# Patient Record
Sex: MALE | Race: WHITE | NOT HISPANIC OR LATINO | Employment: FULL TIME | URBAN - NONMETROPOLITAN AREA
[De-identification: names, ages, dates, MRNs, and addresses within clinical notes are randomized per-mention and may not be internally consistent; named-entity substitution may affect disease eponyms.]

---

## 2017-10-27 ENCOUNTER — OFFICE VISIT (OUTPATIENT)
Dept: URGENT CARE | Facility: PHYSICIAN GROUP | Age: 52
End: 2017-10-27
Payer: COMMERCIAL

## 2017-10-27 VITALS
DIASTOLIC BLOOD PRESSURE: 70 MMHG | HEART RATE: 72 BPM | SYSTOLIC BLOOD PRESSURE: 126 MMHG | WEIGHT: 212 LBS | TEMPERATURE: 97.7 F | OXYGEN SATURATION: 96 % | RESPIRATION RATE: 16 BRPM

## 2017-10-27 DIAGNOSIS — R09.82 PND (POST-NASAL DRIP): ICD-10-CM

## 2017-10-27 DIAGNOSIS — Z88.9 H/O SEASONAL ALLERGIES: ICD-10-CM

## 2017-10-27 DIAGNOSIS — J02.8 SORE THROAT (VIRAL): ICD-10-CM

## 2017-10-27 DIAGNOSIS — B97.89 SORE THROAT (VIRAL): ICD-10-CM

## 2017-10-27 PROCEDURE — 99203 OFFICE O/P NEW LOW 30 MIN: CPT | Performed by: NURSE PRACTITIONER

## 2017-10-27 ASSESSMENT — ENCOUNTER SYMPTOMS
MYALGIAS: 0
HEADACHES: 0
FEVER: 0
ABDOMINAL PAIN: 0
EYE REDNESS: 0
NAUSEA: 0
CONSTIPATION: 0
VOMITING: 0
CHILLS: 0
NECK PAIN: 0
COUGH: 0
DIARRHEA: 0
SORE THROAT: 1
DIZZINESS: 0
EYE DISCHARGE: 0
WEAKNESS: 0

## 2017-10-27 NOTE — PROGRESS NOTES
"Subjective:      Minh Oliver is a 51 y.o. male who presents with Pharyngitis (x2wks on and off, Pt thinks it may be allergies)            HPI  Minh is a 51 year old male who is here for sore throat x 2 weeks. States will \"come and go\", \"worse in the morning\". Denies fever, nasal congestion, ear pressure. Dry scratchy throat with \"dry mouth\". Takes intermittent Zyrtec and Flonase.No salt water gargling. No fevers or exposure to strep.    PMH:  has no past medical history on file.  MEDS:   Current Outpatient Prescriptions:   •  esomeprazole (NEXIUM) 40 MG capsule, Take 40 mg by mouth every morning before breakfast., Disp: , Rfl:   •  EPINEPHrine 0.3 MG/0.3ML SOAJ, 0.3 mg by Injection route as needed (for bee sting (Autoinjector))., Disp: 2 Each, Rfl: 0  ALLERGIES:   Allergies   Allergen Reactions   • Bee      SURGHX: History reviewed. No pertinent surgical history.  SOCHX:  reports that he has never smoked. He has never used smokeless tobacco. He reports that he does not use drugs.  FH: Family history was reviewed, no pertinent findings to report    Review of Systems   Constitutional: Negative for chills, fever and malaise/fatigue.   HENT: Positive for sore throat. Negative for congestion and ear pain.    Eyes: Negative for discharge and redness.   Respiratory: Negative for cough.    Gastrointestinal: Negative for abdominal pain, constipation, diarrhea, nausea and vomiting.   Musculoskeletal: Negative for myalgias and neck pain.   Neurological: Negative for dizziness, weakness and headaches.   Endo/Heme/Allergies: Positive for environmental allergies.   All other systems reviewed and are negative.         Objective:     /70   Pulse 72   Temp 36.5 °C (97.7 °F)   Resp 16   Wt 96.2 kg (212 lb)   SpO2 96%      Physical Exam   Constitutional: He is oriented to person, place, and time. Vital signs are normal. He appears well-developed and well-nourished. He is active and cooperative.  Non-toxic " appearance. He does not have a sickly appearance. He does not appear ill. No distress.   HENT:   Head: Normocephalic.   Right Ear: Hearing, external ear and ear canal normal. Tympanic membrane is injected.   Left Ear: Hearing, external ear and ear canal normal. Tympanic membrane is injected.   Nose: Mucosal edema present. No rhinorrhea or sinus tenderness.   Mouth/Throat: Uvula is midline. Mucous membranes are dry. No uvula swelling. Posterior oropharyngeal erythema present. No oropharyngeal exudate or posterior oropharyngeal edema.   Eyes: Conjunctivae and EOM are normal. Pupils are equal, round, and reactive to light.   Neck: Normal range of motion. Neck supple.   Cardiovascular: Normal rate and regular rhythm.    Pulmonary/Chest: Effort normal and breath sounds normal. No accessory muscle usage. No respiratory distress.   Musculoskeletal: Normal range of motion.   Neurological: He is alert and oriented to person, place, and time.   Skin: Skin is warm and dry. He is not diaphoretic.   Vitals reviewed.              Assessment/Plan:     1. Sore throat (viral)    2. PND (post-nasal drip)    4. H/O seasonal allergies    Increase water intake  Get rest  May use Ibuprofen/Tylenol prn for any fever, body aches or throat pain  May take longer acting antihistamine for seasonal allergy symptoms prn  May use saline nasal spray for nasal congestion  May use Flonase or Nasocort for allergen nasal congestion  May gargle with salt water prn for throat discomfort  May drink smoothies for nutrition if too painful to swallow solid foods  Monitor for productive cough, SOB and chest pain/tightness- need re-evaluation

## 2021-07-27 ENCOUNTER — PRE-ADMISSION TESTING (OUTPATIENT)
Dept: ADMISSIONS | Facility: MEDICAL CENTER | Age: 56
End: 2021-07-27
Attending: UROLOGY
Payer: COMMERCIAL

## 2021-07-27 ENCOUNTER — APPOINTMENT (OUTPATIENT)
Dept: ADMISSIONS | Facility: MEDICAL CENTER | Age: 56
End: 2021-07-27
Payer: COMMERCIAL

## 2021-07-27 VITALS — BODY MASS INDEX: 24.87 KG/M2 | HEIGHT: 75 IN | WEIGHT: 200 LBS

## 2021-07-27 RX ORDER — TAMSULOSIN HYDROCHLORIDE 0.4 MG/1
CAPSULE ORAL
COMMUNITY
Start: 2021-07-19 | End: 2021-07-27

## 2021-07-27 RX ORDER — SULFAMETHOXAZOLE AND TRIMETHOPRIM 800; 160 MG/1; MG/1
TABLET ORAL
COMMUNITY
Start: 2021-07-19 | End: 2021-07-27

## 2021-07-27 RX ORDER — ONDANSETRON 4 MG/1
TABLET, ORALLY DISINTEGRATING ORAL
COMMUNITY
Start: 2021-07-19

## 2021-07-27 RX ORDER — HYDROCODONE BITARTRATE AND ACETAMINOPHEN 10; 300 MG/1; MG/1
TABLET ORAL
COMMUNITY
Start: 2021-07-19 | End: 2021-07-27

## 2021-07-27 RX ORDER — HYDROCODONE BITARTRATE AND ACETAMINOPHEN 10; 325 MG/1; MG/1
TABLET ORAL
COMMUNITY
Start: 2021-07-19 | End: 2022-09-27

## 2021-07-27 RX ORDER — PROMETHAZINE HYDROCHLORIDE 12.5 MG/1
SUPPOSITORY RECTAL
COMMUNITY
Start: 2021-07-17 | End: 2022-09-27

## 2021-07-27 RX ORDER — TAMSULOSIN HYDROCHLORIDE 0.4 MG/1
CAPSULE ORAL
COMMUNITY
Start: 2021-07-19 | End: 2022-09-27

## 2021-07-27 RX ORDER — SULFAMETHOXAZOLE AND TRIMETHOPRIM 800; 160 MG/1; MG/1
1 TABLET ORAL DAILY
COMMUNITY
Start: 2021-07-19 | End: 2022-09-27

## 2021-07-27 NOTE — PREPROCEDURE INSTRUCTIONS
"Preadmit Phone appointment: \" Preparing for your Procedure information\" Instructions discussed with Patient.   Patient instructed to continue prescribed medications through the day before surgery, instructed to take the following medications the day of surgery per anesthesia protocol: norco, omeprazole, zofran, flomax.   Pt states, \"no issues with anesthesia\".  Fasting guidelines discussed with Patient,  NPO from solid food at midnight prior to surgery.  Pt encouraged to hydrate the day before surgery.with NPO from solid food at midnight prior to surgery and 3 hour cutoff for clear liquids.   Clear liquids defined.  All Pt's questions and concerns answered or addressed.  Emailed all instructions.  COVID Vaccinated.  "

## 2021-07-28 ENCOUNTER — ANESTHESIA EVENT (OUTPATIENT)
Dept: SURGERY | Facility: MEDICAL CENTER | Age: 56
End: 2021-07-28
Payer: COMMERCIAL

## 2021-07-28 ENCOUNTER — ANESTHESIA (OUTPATIENT)
Dept: SURGERY | Facility: MEDICAL CENTER | Age: 56
End: 2021-07-28
Payer: COMMERCIAL

## 2021-07-28 ENCOUNTER — APPOINTMENT (OUTPATIENT)
Dept: RADIOLOGY | Facility: MEDICAL CENTER | Age: 56
End: 2021-07-28
Attending: UROLOGY
Payer: COMMERCIAL

## 2021-07-28 ENCOUNTER — HOSPITAL ENCOUNTER (OUTPATIENT)
Facility: MEDICAL CENTER | Age: 56
End: 2021-07-28
Attending: UROLOGY | Admitting: UROLOGY
Payer: COMMERCIAL

## 2021-07-28 VITALS
WEIGHT: 202.6 LBS | TEMPERATURE: 96.8 F | RESPIRATION RATE: 16 BRPM | BODY MASS INDEX: 25.32 KG/M2 | HEART RATE: 52 BPM | SYSTOLIC BLOOD PRESSURE: 146 MMHG | DIASTOLIC BLOOD PRESSURE: 81 MMHG | OXYGEN SATURATION: 98 %

## 2021-07-28 PROBLEM — N28.9 RENAL DISORDER: Status: ACTIVE | Noted: 2021-01-01

## 2021-07-28 LAB — PATHOLOGY CONSULT NOTE: NORMAL

## 2021-07-28 PROCEDURE — 160028 HCHG SURGERY MINUTES - 1ST 30 MINS LEVEL 3: Performed by: UROLOGY

## 2021-07-28 PROCEDURE — 700102 HCHG RX REV CODE 250 W/ 637 OVERRIDE(OP): Performed by: UROLOGY

## 2021-07-28 PROCEDURE — 500879 HCHG PACK, CYSTO: Performed by: UROLOGY

## 2021-07-28 PROCEDURE — C1758 CATHETER, URETERAL: HCPCS | Performed by: UROLOGY

## 2021-07-28 PROCEDURE — 160035 HCHG PACU - 1ST 60 MINS PHASE I: Performed by: UROLOGY

## 2021-07-28 PROCEDURE — C1894 INTRO/SHEATH, NON-LASER: HCPCS | Performed by: UROLOGY

## 2021-07-28 PROCEDURE — A9270 NON-COVERED ITEM OR SERVICE: HCPCS | Performed by: UROLOGY

## 2021-07-28 PROCEDURE — 160039 HCHG SURGERY MINUTES - EA ADDL 1 MIN LEVEL 3: Performed by: UROLOGY

## 2021-07-28 PROCEDURE — 700105 HCHG RX REV CODE 258: Performed by: UROLOGY

## 2021-07-28 PROCEDURE — C2617 STENT, NON-COR, TEM W/O DEL: HCPCS | Performed by: UROLOGY

## 2021-07-28 PROCEDURE — 160025 RECOVERY II MINUTES (STATS): Performed by: UROLOGY

## 2021-07-28 PROCEDURE — 160002 HCHG RECOVERY MINUTES (STAT): Performed by: UROLOGY

## 2021-07-28 PROCEDURE — 160048 HCHG OR STATISTICAL LEVEL 1-5: Performed by: UROLOGY

## 2021-07-28 PROCEDURE — 700101 HCHG RX REV CODE 250: Performed by: UROLOGY

## 2021-07-28 PROCEDURE — 500062 HCHG BASKET: Performed by: UROLOGY

## 2021-07-28 PROCEDURE — C1769 GUIDE WIRE: HCPCS | Performed by: UROLOGY

## 2021-07-28 PROCEDURE — 88300 SURGICAL PATH GROSS: CPT

## 2021-07-28 PROCEDURE — 700111 HCHG RX REV CODE 636 W/ 250 OVERRIDE (IP): Performed by: ANESTHESIOLOGY

## 2021-07-28 PROCEDURE — 501329 HCHG SET, CYSTO IRRIG Y TUR: Performed by: UROLOGY

## 2021-07-28 PROCEDURE — 160009 HCHG ANES TIME/MIN: Performed by: UROLOGY

## 2021-07-28 PROCEDURE — 82365 CALCULUS SPECTROSCOPY: CPT

## 2021-07-28 PROCEDURE — 700101 HCHG RX REV CODE 250: Performed by: ANESTHESIOLOGY

## 2021-07-28 PROCEDURE — 160046 HCHG PACU - 1ST 60 MINS PHASE II: Performed by: UROLOGY

## 2021-07-28 DEVICE — STENT UROLOGICAL POLARIS 6X26  ULTRA: Type: IMPLANTABLE DEVICE | Site: URETER | Status: FUNCTIONAL

## 2021-07-28 RX ORDER — HYDRALAZINE HYDROCHLORIDE 20 MG/ML
5 INJECTION INTRAMUSCULAR; INTRAVENOUS
Status: DISCONTINUED | OUTPATIENT
Start: 2021-07-28 | End: 2021-07-28 | Stop reason: HOSPADM

## 2021-07-28 RX ORDER — SODIUM CHLORIDE, SODIUM LACTATE, POTASSIUM CHLORIDE, CALCIUM CHLORIDE 600; 310; 30; 20 MG/100ML; MG/100ML; MG/100ML; MG/100ML
INJECTION, SOLUTION INTRAVENOUS CONTINUOUS
Status: DISCONTINUED | OUTPATIENT
Start: 2021-07-28 | End: 2021-07-28 | Stop reason: HOSPADM

## 2021-07-28 RX ORDER — DIPHENHYDRAMINE HYDROCHLORIDE 50 MG/ML
12.5 INJECTION INTRAMUSCULAR; INTRAVENOUS
Status: DISCONTINUED | OUTPATIENT
Start: 2021-07-28 | End: 2021-07-28 | Stop reason: HOSPADM

## 2021-07-28 RX ORDER — HYDROMORPHONE HYDROCHLORIDE 1 MG/ML
0.4 INJECTION, SOLUTION INTRAMUSCULAR; INTRAVENOUS; SUBCUTANEOUS
Status: DISCONTINUED | OUTPATIENT
Start: 2021-07-28 | End: 2021-07-28 | Stop reason: HOSPADM

## 2021-07-28 RX ORDER — HALOPERIDOL 5 MG/ML
1 INJECTION INTRAMUSCULAR
Status: DISCONTINUED | OUTPATIENT
Start: 2021-07-28 | End: 2021-07-28 | Stop reason: HOSPADM

## 2021-07-28 RX ORDER — OXYCODONE HCL 5 MG/5 ML
5 SOLUTION, ORAL ORAL
Status: DISCONTINUED | OUTPATIENT
Start: 2021-07-28 | End: 2021-07-28 | Stop reason: HOSPADM

## 2021-07-28 RX ORDER — ONDANSETRON 2 MG/ML
INJECTION INTRAMUSCULAR; INTRAVENOUS PRN
Status: DISCONTINUED | OUTPATIENT
Start: 2021-07-28 | End: 2021-07-28 | Stop reason: SURG

## 2021-07-28 RX ORDER — LABETALOL HYDROCHLORIDE 5 MG/ML
5 INJECTION, SOLUTION INTRAVENOUS
Status: DISCONTINUED | OUTPATIENT
Start: 2021-07-28 | End: 2021-07-28 | Stop reason: HOSPADM

## 2021-07-28 RX ORDER — DEXAMETHASONE SODIUM PHOSPHATE 4 MG/ML
INJECTION, SOLUTION INTRA-ARTICULAR; INTRALESIONAL; INTRAMUSCULAR; INTRAVENOUS; SOFT TISSUE PRN
Status: DISCONTINUED | OUTPATIENT
Start: 2021-07-28 | End: 2021-07-28 | Stop reason: SURG

## 2021-07-28 RX ORDER — MEPERIDINE HYDROCHLORIDE 25 MG/ML
6.25 INJECTION INTRAMUSCULAR; INTRAVENOUS; SUBCUTANEOUS
Status: DISCONTINUED | OUTPATIENT
Start: 2021-07-28 | End: 2021-07-28 | Stop reason: HOSPADM

## 2021-07-28 RX ORDER — ONDANSETRON 2 MG/ML
4 INJECTION INTRAMUSCULAR; INTRAVENOUS
Status: DISCONTINUED | OUTPATIENT
Start: 2021-07-28 | End: 2021-07-28 | Stop reason: HOSPADM

## 2021-07-28 RX ORDER — HYDROMORPHONE HYDROCHLORIDE 1 MG/ML
0.2 INJECTION, SOLUTION INTRAMUSCULAR; INTRAVENOUS; SUBCUTANEOUS
Status: DISCONTINUED | OUTPATIENT
Start: 2021-07-28 | End: 2021-07-28 | Stop reason: HOSPADM

## 2021-07-28 RX ORDER — HYDROMORPHONE HYDROCHLORIDE 1 MG/ML
0.1 INJECTION, SOLUTION INTRAMUSCULAR; INTRAVENOUS; SUBCUTANEOUS
Status: DISCONTINUED | OUTPATIENT
Start: 2021-07-28 | End: 2021-07-28 | Stop reason: HOSPADM

## 2021-07-28 RX ORDER — OXYCODONE HCL 5 MG/5 ML
10 SOLUTION, ORAL ORAL
Status: DISCONTINUED | OUTPATIENT
Start: 2021-07-28 | End: 2021-07-28 | Stop reason: HOSPADM

## 2021-07-28 RX ORDER — CEFAZOLIN SODIUM 1 G/3ML
INJECTION, POWDER, FOR SOLUTION INTRAMUSCULAR; INTRAVENOUS PRN
Status: DISCONTINUED | OUTPATIENT
Start: 2021-07-28 | End: 2021-07-28 | Stop reason: SURG

## 2021-07-28 RX ORDER — SODIUM CHLORIDE, SODIUM LACTATE, POTASSIUM CHLORIDE, CALCIUM CHLORIDE 600; 310; 30; 20 MG/100ML; MG/100ML; MG/100ML; MG/100ML
INJECTION, SOLUTION INTRAVENOUS CONTINUOUS
Status: ACTIVE | OUTPATIENT
Start: 2021-07-28 | End: 2021-07-28

## 2021-07-28 RX ORDER — LIDOCAINE HYDROCHLORIDE 20 MG/ML
INJECTION, SOLUTION EPIDURAL; INFILTRATION; INTRACAUDAL; PERINEURAL PRN
Status: DISCONTINUED | OUTPATIENT
Start: 2021-07-28 | End: 2021-07-28 | Stop reason: SURG

## 2021-07-28 RX ADMIN — DEXAMETHASONE SODIUM PHOSPHATE 8 MG: 4 INJECTION, SOLUTION INTRAMUSCULAR; INTRAVENOUS at 13:46

## 2021-07-28 RX ADMIN — FENTANYL CITRATE 50 MCG: 50 INJECTION, SOLUTION INTRAMUSCULAR; INTRAVENOUS at 13:55

## 2021-07-28 RX ADMIN — FENTANYL CITRATE 50 MCG: 50 INJECTION, SOLUTION INTRAMUSCULAR; INTRAVENOUS at 13:46

## 2021-07-28 RX ADMIN — FENTANYL CITRATE 50 MCG: 50 INJECTION, SOLUTION INTRAMUSCULAR; INTRAVENOUS at 14:27

## 2021-07-28 RX ADMIN — LIDOCAINE HYDROCHLORIDE 50 MG: 20 INJECTION, SOLUTION EPIDURAL; INFILTRATION; INTRACAUDAL; PERINEURAL at 13:45

## 2021-07-28 RX ADMIN — CEFAZOLIN 2 G: 330 INJECTION, POWDER, FOR SOLUTION INTRAMUSCULAR; INTRAVENOUS at 13:46

## 2021-07-28 RX ADMIN — SODIUM CHLORIDE, POTASSIUM CHLORIDE, SODIUM LACTATE AND CALCIUM CHLORIDE: 600; 310; 30; 20 INJECTION, SOLUTION INTRAVENOUS at 11:44

## 2021-07-28 RX ADMIN — LIDOCAINE HYDROCHLORIDE 0.5 ML: 10 INJECTION, SOLUTION INFILTRATION; PERINEURAL at 11:44

## 2021-07-28 RX ADMIN — PROPOFOL 200 MG: 10 INJECTION, EMULSION INTRAVENOUS at 13:45

## 2021-07-28 RX ADMIN — ONDANSETRON 4 MG: 2 INJECTION INTRAMUSCULAR; INTRAVENOUS at 13:46

## 2021-07-28 RX ADMIN — POVIDONE IODINE 15 ML: 100 SOLUTION TOPICAL at 11:44

## 2021-07-28 NOTE — OR NURSING
1546: Pt describes void (not witnessed by RN) as bloody but no clots.  1555: D/Jim to care of family post uneventful stay in PACU 2.

## 2021-07-28 NOTE — DISCHARGE INSTRUCTIONS
ACTIVITY: Rest and take it easy for the first 24 hours.  A responsible adult is recommended to remain with you during that time.  It is normal to feel sleepy.  We encourage you to not do anything that requires balance, judgment or coordination.    MILD FLU-LIKE SYMPTOMS ARE NORMAL. YOU MAY EXPERIENCE GENERALIZED MUSCLE ACHES, THROAT IRRITATION, HEADACHE AND/OR SOME NAUSEA.    FOR 24 HOURS DO NOT:  Drive, operate machinery or run household appliances.  Drink beer or alcoholic beverages.   Make important decisions or sign legal documents.    SPECIAL INSTRUCTIONS: Drink a lot of fluids to keep urine dilute. Follow-up in office on Monday 8/2 for stent on a string removal. Follow-up with Dr. Fountain in 6 weeks with ultrasound of the kidney and bladder day of visit.    DIET: To avoid nausea, slowly advance diet as tolerated, avoiding spicy or greasy foods for the first day.  Add more substantial food to your diet according to your physician's instructions.  INCREASE FLUIDS AND FIBER TO AVOID CONSTIPATION.    SURGICAL DRESSING/BATHING: may shower. No soaking in tub baths or hot tubs until follow up with MD    FOLLOW-UP APPOINTMENT:  A follow-up appointment should be arranged with your doctor; call to schedule.    You should CALL YOUR PHYSICIAN if you develop:  Fever greater than 101 degrees F.  Pain not relieved by medication, or persistent nausea or vomiting.  Excessive bleeding (blood soaking through dressing) or unexpected drainage from the wound.  Extreme redness or swelling around the incision site, drainage of pus or foul smelling drainage.  Inability to urinate or empty your bladder within 8 hours.  Problems with breathing or chest pain.    You should call 911 if you develop problems with breathing or chest pain.  If you are unable to contact your doctor or surgical center, you should go to the nearest emergency room or urgent care center.  Physician's telephone #: [phone]    If any questions arise, call your  doctor.  If your doctor is not available, please feel free to call the Surgical Center at [phone]. The Contact Center is open Monday through Friday 7AM to 5PM and may speak to a nurse at [phone], or toll free at [phone].     A registered nurse may call you a few days after your surgery to see how you are doing after your procedure.    MEDICATIONS: Resume taking daily medication.  Take prescribed pain medication with food.  If no medication is prescribed, you may take non-aspirin pain medication if needed.  PAIN MEDICATION CAN BE VERY CONSTIPATING.  Take a stool softener or laxative such as senokot, pericolace, or milk of magnesia if needed.    Prescription given for n/a.  Last pain medication given at n/a.    If your physician has prescribed pain medication that includes Acetaminophen (Tylenol), do not take additional Acetaminophen (Tylenol) while taking the prescribed medication.    Depression / Suicide Risk    As you are discharged from this [facility] facility, it is important to learn how to keep safe from harming yourself.    Recognize the warning signs:  · Abrupt changes in personality, positive or negative- including increase in energy   · Giving away possessions  · Change in eating patterns- significant weight changes-  positive or negative  · Change in sleeping patterns- unable to sleep or sleeping all the time   · Unwillingness or inability to communicate  · Depression  · Unusual sadness, discouragement and loneliness  · Talk of wanting to die  · Neglect of personal appearance   · Rebelliousness- reckless behavior  · Withdrawal from people/activities they love  · Confusion- inability to concentrate     If you or a loved one observes any of these behaviors or has concerns about self-harm, here's what you can do:  · Talk about it- your feelings and reasons for harming yourself  · Remove any means that you might use to hurt yourself (examples: pills, rope, extension cords,  firearm)  · Get professional help from the community (Mental Health, Substance Abuse, psychological counseling)  · Do not be alone:Call your Safe Contact- someone whom you trust who will be there for you.  · Call your local CRISIS HOTLINE 516-1790 or 247-594-8682  · Call your local Children's Mobile Crisis Response Team [place] (405) 471-0298 or www.Moodswiing  · Call the toll free National Suicide Prevention Hotlines   · National Suicide Prevention Lifeline 450-242-WDUU (0122)  · National Hope Line Network 800-SUICIDE (361-0094)

## 2021-07-28 NOTE — ANESTHESIA PREPROCEDURE EVALUATION
Relevant Problems   ANESTHESIA (within normal limits)      PULMONARY (within normal limits)      NEURO (within normal limits)      CARDIAC (within normal limits)      GI (within normal limits)         (positive) Renal disorder      ENDO (within normal limits)      OB (within normal limits)       Physical Exam    Airway   Mallampati: II  TM distance: >3 FB  Neck ROM: full       Cardiovascular - normal exam  Rhythm: regular  Rate: normal  (-) murmur     Dental - normal exam           Pulmonary - normal exam  Breath sounds clear to auscultation     Abdominal    Neurological - normal exam                 Anesthesia Plan    ASA 2       Plan - general       Airway plan will be LMA          Induction: intravenous    Postoperative Plan: Postoperative administration of opioids is intended.    Pertinent diagnostic labs and testing reviewed    Informed Consent:    Anesthetic plan and risks discussed with patient.    Use of blood products discussed with: patient whom consented to blood products.

## 2021-07-28 NOTE — ANESTHESIA PROCEDURE NOTES
Airway    Date/Time: 7/28/2021 1:45 PM  Performed by: Lukas Barger M.D.  Authorized by: Lukas Barger M.D.     Location:  OR  Urgency:  Elective  Indications for Airway Management:  Anesthesia      Spontaneous Ventilation: absent    Sedation Level:  Deep  Preoxygenated: Yes    Final Airway Type:  Supraglottic airway  Final Supraglottic Airway:  Standard LMA    SGA Size:  4  Number of Attempts at Approach:  1

## 2021-07-28 NOTE — OR NURSING
Patient to preop, allergies and NPO status verified, home medications reconciled, belongings secured, verbalizes understanding of pain scale, surgical site verified by RN and cleaned and prepped by CNA, IV access established, triple aim completed.

## 2021-07-28 NOTE — ANESTHESIA TIME REPORT
Anesthesia Start and Stop Event Times     Date Time Event    7/28/2021 1320 Ready for Procedure     1340 Anesthesia Start     1450 Anesthesia Stop        Responsible Staff  07/28/21    Name Role Begin Dg Barger M.D. Anesth 1340 1450        Preop Diagnosis (Free Text):  Pre-op Diagnosis     KIDNEY STONE        Preop Diagnosis (Codes):  Diagnosis Information     Diagnosis Code(s): Kidney stone [N20.0]        Post op Diagnosis  Kidney stone      Premium Reason  Non-Premium    Comments:

## 2021-07-28 NOTE — OR SURGEON
Immediate Post OP Note    PreOp Diagnosis: Left ureterolithiasis      PostOp Diagnosis: As above    Procedure(s):  CYSTOSCOPY WITH LEFT URETERAL STENT INSERTION - Wound Class: Clean  LEFT URETEROSCOPY WITH LASER LITHOTRIPSY AND STONE BASKETING. - Wound Class: Clean    Surgeon(s):  Gonzalo Fountain M.D.    Anesthesiologist/Type of Anesthesia:  Anesthesiologist: Lukas Barger M.D./General LMA    Surgical Staff:  Circulator: Heather C Cogan, R.N.  Laser Staff: Claira J Schwab  Scrub Person: Sherin Mcdonald  Radiology Technologist: Roma Sam    Specimens removed if any:  ID Type Source Tests Collected by Time Destination   A : kidney stone Other Other PATHOLOGY SPECIMEN Gonzalo Fountain M.D. 7/28/2021  2:41 PM        Estimated Blood Loss: N/A    Findings: Distal ureteral stricture at UVJ dilated with the semirigid ureteroscope    Complications: None  Drains: 6 Lithuanian x 26 cm stent on string.        7/28/2021 2:47 PM Gonzalo Fountain M.D.

## 2021-07-29 ASSESSMENT — PAIN SCALES - GENERAL: PAIN_LEVEL: 0

## 2021-07-29 NOTE — ANESTHESIA POSTPROCEDURE EVALUATION
Patient: Minh Oliver    Procedure Summary     Date: 07/28/21 Room / Location:  OR  / SURGERY [facility]    Anesthesia Start: 1340 Anesthesia Stop: 1450    Procedures:       CYSTOSCOPY, WITH URETERAL STENT INSERTION (Left Ureter)      URETEROSCOPY (Left Ureter)      LITHOTRIPSY, USING LASER. (Left Ureter) Diagnosis:       Kidney stone      (KIDNEY STONE)    Surgeons: Gonzalo Fountain M.D. Responsible Provider: Lukas Barger M.D.    Anesthesia Type: general ASA Status: 2          Final Anesthesia Type: general  Last vitals  BP   Blood Pressure: 146/81, NIBP: 141/94    Temp   36 °C (96.8 °F)    Pulse   (!) 52   Resp   16    SpO2   98 %      Anesthesia Post Evaluation    Patient location during evaluation: PACU  Patient participation: complete - patient participated  Level of consciousness: awake and alert  Pain score: 0    Airway patency: patent  Anesthetic complications: no  Cardiovascular status: hemodynamically stable  Respiratory status: acceptable  Hydration status: euvolemic    PONV: none          No complications documented.     Nurse Pain Score: 0 (NPRS)

## 2021-07-29 NOTE — OP REPORT
DATE OF SERVICE:  07/28/2021     PREOPERATIVE DIAGNOSES:  1.  Left proximal ureterolithiasis.  2.  History of urinary tract infection, status post treatment with oral   antibiotic therapy.  3.  Intermittent left flank pain.     OPERATIONS AND PROCEDURES PERFORMED:  1.  Rigid cystourethroscopy.  2.  Left flexible ureteroscopy with holmium laser lithotripsy of dense 7.5-mm   distal ureteral stone.  3.  Left semirigid ureteroscopy for dilation of ureteral narrowing at   ureterovesical junction with stone basketing of small fragments for chemical   composition analysis and retrieval.  4.  Left 6-Martiniquais x26 cm double-J stent placement on a string.     SURGEON:  Gonzalo Fountain MD     ANESTHESIA:  General laryngeal mask.     POSTOPERATIVE DIAGNOSES:  1.  Left flank pain.  2.  Left distal ureterolithiasis with stone passage to the distal ureter.  3.  Left ureteral narrowing at ureterovesical junction consistent with   possible wide caliber ureteral stricture.     SPECIMENS:  Left ureteral stone to pathology for chemical composition   analysis.     DRAINS:  A 6-Martiniquais x 26cm stent on a string.     INDICATIONS:  The patient is a pleasant gentleman with a history of left flank   pain.  He also had some fever and urinary tract symptoms.  He was evaluated   and found to have a proximal ureteral stone 7.5 mm with hydronephrosis.  The   stone was slightly irregularly shaped and he underwent antibiotic therapy.  He   was seen by Dr. Sheba matthew and prepared for surgery with myself.  Prior   to surgery, I met with the patient in the presence of his wife and I   discussed the plan to perform cystoscopy, ureteroscopy, laser lithotripsy and   possible stent.  We discussed the risk of the above proposed procedures   including, but not limited to risk of perioperative urinary tract infection,   urosepsis, risk of urethral strictures, a delayed complication of   instrumentation, risk of ureteral perforation in less than 1% of cases    requiring a stent for a longer period of time and the potential risk of   stricture.  I have also discussed there is a potential risk of failure to   treat the stone requiring secondary procedures and if access is a problem,   even a nephrostomy tube.  I have also discussed that with the stone treatment,   a stent may need to be positioned and if a stent is positioned, it can be   placed on a string or left internally without a string.  I discussed the   rationale for this decision-making and the fact that the stent has associated   risk of causing pain, urgency, frequency, dysuria, hematuria and failure to   follow through with removal of the stent can lead to significant stone   formation and loss of renal function.  In addition, we discussed the   perioperative risk of deep vein thrombosis, pulmonary embolism, aspiration   pneumonia, heart attack, stroke and death.  Informed consent was given to me   by the patient to proceed and he is marked on his left thigh with my initials   and letter Y for safe site surgery.     DESCRIPTION OF DETAIL:  After informed consent was obtained, the patient is   brought to the operating room and placed supine.  Bilateral sequential   compression device were in place and operational.  A general laryngeal mask   anesthetic administered in a balanced fashion.  He is positioned into modified   lithotomy where the operative area was Betadine prepped and draped in the   usual sterile fashion.  A surgical timeout was called and all members of the   operative team agree with the patient's name, procedure to be performed and   the fact that it is left side and a CT scan is available and fluoroscopic   imaging shows there is no stone in the proximal ureter that has migrated into   the distal ureter.  At this point in time, I passed a generously lubricated   22-Bruneian rigid cystoscope per urethra.  The anterior urethra was normal.  The   prostatic fossa measured 4.5 cm in length with  bilobar occlusive hypertrophy.    Upon entering the bladder, serial evaluation shows no evidence of stone,   tumor, diverticula.  There was clear efflux of the right ureteral orifice and   efflux was noted from the left ureteral orifice.  He has moderate   trabeculation.  I cannulated the left ureteral orifice with a 0.38 sensor wire   and with the back and forward motion, I was able to get the wire up above the   level of the stone visualized in the distal ureter at the junction of the mid   and distal ureter.  Once the wire was in the kidney, there was increased   drainage from the left renal unit out of the orifice and I proceeded to pass   an ureteral access sheath, inner obturator, 11-Armenian tapered to 13 over the   wire.  I was able to dilate the distal ureteral orifice, but it would not   dilate above probably the junction of the ureter with the intravesical bladder   component.  I was able to get the inner obturator to pass, but the entire   sheath could not be passed suggesting some level of obstruction at this   location.  Subsequently, with the guidewire in place, I advanced the flexible   disposable ureteroscope over the wire and with some backward traction in the   wire, I was able to advance the scope up to the level of the stone.  The stone   was treated with the 200 micron laser fiber.  It was an extremely dense   stone.  I had to use an energy level of 1500 millijoules and a frequency of 5   to treat it into numerous small fragments.  Some of the fragments were dusted   at a higher frequency, but the stone was hard to reach due to narrowing at the   ureterovesical junction, which was holding the advancement of the   ureteroscope further.  Subsequently, at this point in time, I elected because   there was still a large stone fragment at a distance for safe treatment, I put   a safety wire through the disposable ureteroscope, advanced it up in the   kidney and over that, passed a semirigid  ureteroscope.  This allowed me to   dilate the ureterovesical junction.  I was able to get the stone located in a   more safe position for treatment.  This was again treated at the higher energy   setting of 1500 millijoules and a frequency of 5.  I had broke it into 3   pieces of which I was able to basket the pieces with a Zero Tip basket.  Once   these stones were retrieved, they were set in the bladder and I advanced the   scope back up, retrieving these stones.  At the completion of the stone   retrieval, a guidewire was advanced up into the kidney and I elected to   position a stent as there was significant edema at the ureterovesical junction   and a fair amount of edema where the stone was located.  I did pass a   6-Portuguese stent on a 26 cm length up into the kidney.  When the guidewire was   withdrawn, the string was left on the stent and there was a good coil in the   renal pelvis.  The eyelets in the bladder were seen draining and the string   was secured to the phallus with benzoin and Steri-Strips.  At the end of the   case, the bladder had been drained.  He tolerated the procedure well without   complication, was awakened in the operating room and transferred to the   recovery room where he arrived in stable condition.        ______________________________  Gonzalo Fountain MD    [facility]    DD:  07/29/2021 06:19  DT:  07/29/2021 07:22    Job#:  [id number]

## 2021-08-01 LAB
APPEARANCE STONE: NORMAL
COMPN STONE: NORMAL
NUMBER STONE: 1
SIZE STONE: <1 MM
SPECIMEN WT: 14 MG

## 2022-09-27 ENCOUNTER — OFFICE VISIT (OUTPATIENT)
Dept: URGENT CARE | Facility: PHYSICIAN GROUP | Age: 57
End: 2022-09-27
Payer: COMMERCIAL

## 2022-09-27 VITALS
RESPIRATION RATE: 18 BRPM | HEIGHT: 75 IN | DIASTOLIC BLOOD PRESSURE: 80 MMHG | WEIGHT: 206 LBS | OXYGEN SATURATION: 97 % | SYSTOLIC BLOOD PRESSURE: 110 MMHG | HEART RATE: 95 BPM | TEMPERATURE: 101 F | BODY MASS INDEX: 25.61 KG/M2

## 2022-09-27 DIAGNOSIS — H66.001 NON-RECURRENT ACUTE SUPPURATIVE OTITIS MEDIA OF RIGHT EAR WITHOUT SPONTANEOUS RUPTURE OF TYMPANIC MEMBRANE: ICD-10-CM

## 2022-09-27 DIAGNOSIS — J02.9 SORETHROAT: ICD-10-CM

## 2022-09-27 DIAGNOSIS — R05.9 COUGH: ICD-10-CM

## 2022-09-27 LAB
INT CON NEG: NEGATIVE
INT CON POS: POSITIVE
S PYO AG THROAT QL: NEGATIVE

## 2022-09-27 PROCEDURE — 99203 OFFICE O/P NEW LOW 30 MIN: CPT | Performed by: NURSE PRACTITIONER

## 2022-09-27 PROCEDURE — 87880 STREP A ASSAY W/OPTIC: CPT | Performed by: NURSE PRACTITIONER

## 2022-09-27 RX ORDER — AMOXICILLIN AND CLAVULANATE POTASSIUM 875; 125 MG/1; MG/1
1 TABLET, FILM COATED ORAL 2 TIMES DAILY
Qty: 10 TABLET | Refills: 0 | Status: SHIPPED | OUTPATIENT
Start: 2022-09-27 | End: 2022-10-02

## 2022-09-27 ASSESSMENT — ENCOUNTER SYMPTOMS
DIAPHORESIS: 0
SINUS PAIN: 0
WHEEZING: 0
MYALGIAS: 0
SHORTNESS OF BREATH: 0
FEVER: 1
COUGH: 1
CHILLS: 0
HEMOPTYSIS: 0
SORE THROAT: 1
SPUTUM PRODUCTION: 0

## 2022-09-27 NOTE — PROGRESS NOTES
"Subjective     Minh Oliver is a 56 y.o. male who presents with Pharyngitis (X 1 week on an off worse now, voice comes an goes) and Cough (X 4 days/)            Minh comes in today with a complaint of URI symptoms.  He developed symptoms 1 week ago with pharyngitis, hoarse voice and nasal congestion.  He was trending better until a few days ago and then noted new worsening symptoms with fever, bilateral ear fullness, returned sore throat, and a cough that is most noted when supine.  He works as a  and notes the ear fullness most when he is flying.  He denies any chills, chest pain or shortness of breath.  He had covid recently (4-6 weeks ago).        Review of Systems   Constitutional:  Positive for fever and malaise/fatigue. Negative for chills and diaphoresis.   HENT:  Positive for congestion, ear pain and sore throat. Negative for sinus pain.    Respiratory:  Positive for cough. Negative for hemoptysis, sputum production, shortness of breath and wheezing.    Cardiovascular:  Negative for chest pain.   Musculoskeletal:  Negative for myalgias.     Medications, Allergies, and current problem list reviewed today in Epic         Objective     Blood Pressure 110/80   Pulse 95   Temperature (Abnormal) 38.3 °C (101 °F) (Temporal)   Respiration 18   Height 1.905 m (6' 3\")   Weight 93.4 kg (206 lb)   Oxygen Saturation 97%   Body Mass Index 25.75 kg/m²      Physical Exam  Vitals reviewed.   Constitutional:       General: He is not in acute distress.     Appearance: Normal appearance. He is well-developed. He is not ill-appearing, toxic-appearing or diaphoretic.   HENT:      Right Ear: Ear canal and external ear normal. There is no impacted cerumen.      Left Ear: Ear canal and external ear normal. There is no impacted cerumen.      Ears:      Comments: Right TM is erythematous with a purulent effusion.  Left TM has a clear effusion with landmarks intact.       Nose: Rhinorrhea present. No " congestion.      Comments: No sinus TTP.     Mouth/Throat:      Mouth: Mucous membranes are moist.      Pharynx: Posterior oropharyngeal erythema present. No oropharyngeal exudate.      Comments: No exudate or edema.  Post nasal drainage streaking noted.  Phonation normal.    Eyes:      General: No scleral icterus.        Right eye: No discharge.         Left eye: No discharge.      Conjunctiva/sclera: Conjunctivae normal.   Neck:      Thyroid: No thyromegaly.      Vascular: No JVD.      Trachea: No tracheal deviation.   Cardiovascular:      Rate and Rhythm: Regular rhythm. Tachycardia present.      Heart sounds: Normal heart sounds. No murmur heard.    No friction rub. No gallop.   Pulmonary:      Effort: Pulmonary effort is normal. No respiratory distress.      Breath sounds: Normal breath sounds. No stridor. No wheezing, rhonchi or rales.   Chest:      Chest wall: No tenderness.   Musculoskeletal:      Cervical back: Neck supple.   Lymphadenopathy:      Cervical: No cervical adenopathy.   Skin:     General: Skin is warm and dry.      Coloration: Skin is not jaundiced or pale.      Findings: No erythema.   Neurological:      Mental Status: He is alert and oriented to person, place, and time.          POCT rapid strep a: negative                 Assessment & Plan        1. Non-recurrent acute suppurative otitis media of right ear without spontaneous rupture of tympanic membrane    - amoxicillin-clavulanate (AUGMENTIN) 875-125 MG Tab; Take 1 Tablet by mouth 2 times a day for 5 days.  Dispense: 10 Tablet; Refill: 0    2. Sorethroat    - POCT Rapid Strep A    3. Cough    Discussed exam findings with Minh.  Differential reviewed.  Low suspicion for covid test.  He politely declined covid test in clinic.    Take full course of antibiotics.  OTC NSAIDs or tylenol prn fever, pain.  OTC sudafed and cough syrup prn symptom management.  Maintain adequate po hydration.  RTC in 5-7 days if symptoms persist, sooner if  worse.  He verbalized understanding of and agreed with plan of care.

## (undated) DEVICE — DRAPE TABLE UROLOGY DRAINAGE (20EA/BX)

## (undated) DEVICE — GOWN WARMING STANDARD FLEX - (30/CA)

## (undated) DEVICE — BAG SPONGE COUNT 10.25 X 32 - BLUE (250/CA)

## (undated) DEVICE — BASKET ZERO 1.9FR X 120CM

## (undated) DEVICE — SCOPE DIGITAL URETEROSCOPE DISPOSABLE

## (undated) DEVICE — SPONGE GAUZESTER 4 X 4 4PLY - (128PK/CA)

## (undated) DEVICE — NEPTUNE 4 PORT MANIFOLD - (20/PK)

## (undated) DEVICE — BAG URODRAIN WITH TUBING - (20/CA)

## (undated) DEVICE — JELLY SURGILUBE STERILE TUBE 4.25 OZ (1/EA)

## (undated) DEVICE — ELECTRODE DUAL RETURN W/ CORD - (50/PK)

## (undated) DEVICE — LASER TRAC TIP 200 MIRCON FOR 100 WATT LASER

## (undated) DEVICE — SHEATH URET ACCESS 10/12FX35 - (6/BX)

## (undated) DEVICE — MASK ANESTHESIA ADULT  - (100/CA)

## (undated) DEVICE — KIT ANESTHESIA W/CIRCUIT & 3/LT BAG W/FILTER (20EA/CA)

## (undated) DEVICE — SET IRRIGATION CYSTOSCOPY Y-TYPE L81 IN (20EA/CA)

## (undated) DEVICE — GOWN SURGICAL X-LARGE ULTRA - FILM-REINFORCED (20/CA)

## (undated) DEVICE — SODIUM CHL. IRRIGATION 0.9% 3000ML (4EA/CA 65CA/PF)

## (undated) DEVICE — JELLY SURGILUBE STERILE FOIL 5 GM (150EA/BX)

## (undated) DEVICE — HUMID-VENT HEAT AND MOISTURE EXCHANGE- (50/BX)

## (undated) DEVICE — WATER IRRIG. STER 3000 ML - (4/CA)

## (undated) DEVICE — WATER IRRIGATION STERILE 1000ML (12EA/CA)

## (undated) DEVICE — TOWEL STOP TIMEOUT SAFETY FLAG (40EA/CA)

## (undated) DEVICE — SET EXTENSION WITH 2 PORTS (48EA/CA) ***PART #2C8610 IS A SUBSTITUTE*****

## (undated) DEVICE — HEAD HOLDER JUNIOR/ADULT

## (undated) DEVICE — LACTATED RINGERS INJ 1000 ML - (14EA/CA 60CA/PF)

## (undated) DEVICE — GLOVE BIOGEL SZ 7.5 SURGICAL PF LTX - (50PR/BX 4BX/CA)

## (undated) DEVICE — PACK CYSTOSCOPY III - (8/CA)

## (undated) DEVICE — PROTECTOR ULNA NERVE - (36PR/CA)

## (undated) DEVICE — TUBE CONNECTING SUCTION - CLEAR PLASTIC STERILE 72 IN (50EA/CA)

## (undated) DEVICE — SLEEVE, VASO, THIGH, MED

## (undated) DEVICE — GOWN SURGEONS X-LARGE - DISP. (30/CA)

## (undated) DEVICE — WIRE GUIDE SENSOR DUAL FLEX - 5/BX

## (undated) DEVICE — SODIUM CHL IRRIGATION 0.9% 1000ML (12EA/CA)

## (undated) DEVICE — CATHETER URET OPEN END 6FR (10EA/BX)

## (undated) DEVICE — TUBING CLEARLINK DUO-VENT - C-FLO (48EA/CA)

## (undated) DEVICE — CATHETER URET DUAL LUMEN

## (undated) DEVICE — SYRINGE SAFETY 10 ML 18 GA X 1 1/2 BLUNT LL (100/BX 4BX/CA)

## (undated) DEVICE — SENSOR SPO2 NEO LNCS ADHESIVE (20/BX) SEE USER NOTES

## (undated) DEVICE — SUCTION INSTRUMENT YANKAUER BULBOUS TIP W/O VENT (50EA/CA)

## (undated) DEVICE — CANISTER SUCTION RIGID RED 1500CC (40EA/CA)

## (undated) DEVICE — ELECTRODE 850 FOAM ADHESIVE - HYDROGEL RADIOTRNSPRNT (50/PK)